# Patient Record
Sex: MALE | Race: OTHER | HISPANIC OR LATINO
[De-identification: names, ages, dates, MRNs, and addresses within clinical notes are randomized per-mention and may not be internally consistent; named-entity substitution may affect disease eponyms.]

---

## 2017-06-27 ENCOUNTER — RECORD ABSTRACTING (OUTPATIENT)
Age: 22
End: 2017-06-27

## 2017-07-17 ENCOUNTER — APPOINTMENT (OUTPATIENT)
Dept: PEDIATRIC HEMATOLOGY/ONCOLOGY | Facility: CLINIC | Age: 22
End: 2017-07-17

## 2017-07-17 VITALS
WEIGHT: 159.17 LBS | HEIGHT: 71.38 IN | DIASTOLIC BLOOD PRESSURE: 71 MMHG | BODY MASS INDEX: 22.04 KG/M2 | SYSTOLIC BLOOD PRESSURE: 110 MMHG | HEART RATE: 78 BPM | TEMPERATURE: 98.4 F

## 2017-07-17 RX ORDER — CALCIUM CITRATE/VITAMIN D3 315MG-6.25
TABLET ORAL DAILY
Refills: 0 | Status: ACTIVE | COMMUNITY

## 2017-07-17 RX ORDER — CHOLECALCIFEROL (VITAMIN D3) 50 MCG
50 MCG TABLET ORAL DAILY
Refills: 0 | Status: ACTIVE | COMMUNITY

## 2017-07-25 ENCOUNTER — FORM ENCOUNTER (OUTPATIENT)
Age: 22
End: 2017-07-25

## 2017-07-26 ENCOUNTER — OUTPATIENT (OUTPATIENT)
Dept: OUTPATIENT SERVICES | Facility: HOSPITAL | Age: 22
LOS: 1 days | End: 2017-07-26
Payer: COMMERCIAL

## 2017-07-26 ENCOUNTER — APPOINTMENT (OUTPATIENT)
Dept: RADIOLOGY | Facility: IMAGING CENTER | Age: 22
End: 2017-07-26

## 2017-07-26 DIAGNOSIS — T38.0X5D ADVERSE EFFECT OF GLUCOCORTICOIDS AND SYNTHETIC ANALOGUES, SUBSEQUENT ENCOUNTER: ICD-10-CM

## 2017-07-26 PROCEDURE — 77080 DXA BONE DENSITY AXIAL: CPT

## 2017-08-10 ENCOUNTER — RESULT REVIEW (OUTPATIENT)
Age: 22
End: 2017-08-10

## 2020-02-05 ENCOUNTER — APPOINTMENT (OUTPATIENT)
Dept: PEDIATRIC HEMATOLOGY/ONCOLOGY | Facility: CLINIC | Age: 25
End: 2020-02-05
Payer: COMMERCIAL

## 2020-02-05 VITALS
WEIGHT: 167.55 LBS | HEIGHT: 71.57 IN | TEMPERATURE: 97.5 F | SYSTOLIC BLOOD PRESSURE: 125 MMHG | DIASTOLIC BLOOD PRESSURE: 77 MMHG | HEART RATE: 72 BPM | BODY MASS INDEX: 22.94 KG/M2

## 2020-02-05 DIAGNOSIS — Z78.9 OTHER SPECIFIED HEALTH STATUS: ICD-10-CM

## 2020-02-05 DIAGNOSIS — T38.0X5D ADVERSE EFFECT OF GLUCOCORTICOIDS AND SYNTHETIC ANALOGUES, SUBSEQUENT ENCOUNTER: ICD-10-CM

## 2020-02-05 PROCEDURE — 99215 OFFICE O/P EST HI 40 MIN: CPT

## 2020-02-07 PROBLEM — Z78.9 DOES NOT USE ILLICIT DRUGS: Status: ACTIVE | Noted: 2020-02-07

## 2020-02-07 PROBLEM — T38.0X5D ADVERSE EFFECT OF CORTICOSTEROIDS, SUBSEQUENT ENCOUNTER: Status: ACTIVE | Noted: 2017-07-17

## 2020-02-07 PROBLEM — Z78.9 DOES NOT USE TOBACCO: Status: ACTIVE | Noted: 2020-02-07

## 2020-02-07 PROBLEM — Z78.9 SOCIAL ALCOHOL USE: Status: ACTIVE | Noted: 2020-02-07

## 2020-02-07 LAB
25(OH)D3 SERPL-MCNC: 36.2 NG/ML
ALBUMIN SERPL ELPH-MCNC: 5.3 G/DL
ALP BLD-CCNC: 69 U/L
ALT SERPL-CCNC: 18 U/L
ANION GAP SERPL CALC-SCNC: 17 MMOL/L
AST SERPL-CCNC: 16 U/L
BASOPHILS # BLD AUTO: 0.05 K/UL
BASOPHILS NFR BLD AUTO: 0.7 %
BILIRUB SERPL-MCNC: 0.3 MG/DL
BUN SERPL-MCNC: 12 MG/DL
CALCIUM SERPL-MCNC: 10.2 MG/DL
CALCIUM SERPL-MCNC: 10.2 MG/DL
CHLORIDE SERPL-SCNC: 103 MMOL/L
CHOLEST SERPL-MCNC: 178 MG/DL
CHOLEST/HDLC SERPL: 3.1 RATIO
CO2 SERPL-SCNC: 22 MMOL/L
CREAT SERPL-MCNC: 0.98 MG/DL
EOSINOPHIL # BLD AUTO: 0.16 K/UL
EOSINOPHIL NFR BLD AUTO: 2.1 %
ESTIMATED AVERAGE GLUCOSE: 108 MG/DL
GLUCOSE SERPL-MCNC: 92 MG/DL
HBA1C MFR BLD HPLC: 5.4 %
HCT VFR BLD CALC: 49.7 %
HDLC SERPL-MCNC: 58 MG/DL
HGB BLD-MCNC: 16.4 G/DL
IMM GRANULOCYTES NFR BLD AUTO: 0.1 %
LDLC SERPL CALC-MCNC: 102 MG/DL
LYMPHOCYTES # BLD AUTO: 3.36 K/UL
LYMPHOCYTES NFR BLD AUTO: 44.9 %
MAN DIFF?: NORMAL
MCHC RBC-ENTMCNC: 31 PG
MCHC RBC-ENTMCNC: 33 GM/DL
MCV RBC AUTO: 94 FL
MONOCYTES # BLD AUTO: 0.44 K/UL
MONOCYTES NFR BLD AUTO: 5.9 %
NEUTROPHILS # BLD AUTO: 3.46 K/UL
NEUTROPHILS NFR BLD AUTO: 46.3 %
PARATHYROID HORMONE INTACT: 22 PG/ML
PLATELET # BLD AUTO: 190 K/UL
POTASSIUM SERPL-SCNC: 4.1 MMOL/L
PROT SERPL-MCNC: 8 G/DL
RBC # BLD: 5.29 M/UL
RBC # FLD: 13 %
SODIUM SERPL-SCNC: 143 MMOL/L
TRIGL SERPL-MCNC: 91 MG/DL
WBC # FLD AUTO: 7.48 K/UL

## 2020-02-07 NOTE — CONSULT LETTER
[Dear  ___] : Dear  [unfilled], [Courtesy Letter:] : I had the pleasure of seeing your patient, [unfilled], in my office today. [Please see my note below.] : Please see my note below. [Consult Closing:] : Thank you very much for allowing me to participate in the care of this patient.  If you have any questions, please do not hesitate to contact me. [Sincerely,] : Sincerely, [FreeTextEntry2] : Fabiana Howard MD\par [address]\[place] [address]\par Tel: [phone]\par Fax: [phone] [FreeTextEntry3] : Alfredo Her MD\par  of Pediatrics\par [facility]\par Section Head, Survivors Facing Forward Program\par Hematology / Oncology and Stem Cell Transplantation\par [facility]\par [email or web address]\par [email or web address]\par [phone]\par

## 2020-02-07 NOTE — SOCIAL HISTORY
[Mother] : mother [Father] : father [Brother] : brother [College] : College [IEP/504] : does not have an IEP/504 currently in place [Sexually Active] : not sexually active

## 2020-02-07 NOTE — HISTORY OF PRESENT ILLNESS
[de-identified] : 22.4 year old young man now 15.3 years on therapy for acute lymphoblastic anemia. Since his last visit Franklyn has been well without any visits to the emergency room, hospitalizations or surgeries. He's not had persistent fevers weight loss or bone pain. Franklyn's posttreatment course has been complicated by low bone mineral density. At this visit, Franklyn expressed concern about a 'funny feeling' in his knee, and intermittent back pain.\par \par Franklyn graduated from [employer] where he studied information technology, and now works in IT for [employer]. Since his last, he has made a concerted effort to increase his exercise, including resistance. He reported that his diet has been generally healthy. [de-identified] : Yes. [de-identified] : Yes. [de-identified] : Yes. [de-identified] : Yes. [de-identified] : Yes. [de-identified] : Yes.

## 2020-02-07 NOTE — PHYSICAL EXAM
[Testes] : normal [Scars ___] : scars [unfilled] [No focal deficits] : no focal deficits [Gait normal] : gait normal [Motor Exam nomal] : motor exam normal [Normal] : affect appropriate [100: Normal, no complaints, no evidence of disease.] : 100: Normal, no complaints, no evidence of disease. [de-identified] : No weakness, reproducible pain or limit to range of motion in either knee or the spine

## 2020-02-07 NOTE — RESULTS/DATA
[FreeTextEntry1] : EXAM:  BONE DENSITY - CENTRAL  \par \par PROCEDURE DATE:  07/26/2017  \par  \par INTERPRETATION:  CLINICAL STATEMENT: History of acute lymphoblastic \par leukemia and prior abnormal test.\par \par Thank you for referring your patient for bone densitometry on the [employer] \par [employer]. The results are expressed as the Z-score, or the standard \par deviation from age-matched values. A Z-score of -2.0 or lower is defined \par as below the expected range for age. A Z-score above -2.0 is within the \par expected range for age. \par \par COMPARISON: None.\par \par RESULTS:\par \par Left femoral neck: -1.9, within the expected range for age.\par Left total hip: -2.1, below the expected range for age.\par \par Spine: -2.4, below the expected range for age.\par \par IMPRESSION: Low bone density.\par \par FRACTURE RISK: The risk of fracture is above average.\par \par TREATMENT RECOMMENDATIONS: \par \par All treatment decisions require clinical judgment and consideration of \par individual patient factors, including patient preferences, comorbidities, \par previous drug use, risk factors not captured in the FRAX model (e.g. \par frailty, falls, Vitamin D deficiency, increased bone turnover, interval \par significant decline in bone density) and possible under or over \par estimation of fracture risk by FRAX. \par \par In addition the NOF Guide recommends that FDA-approved medical therapies \par be considered in postmenopausal woman and men age >= 50 years with a:  \par * hip or vertebral (clinical or morphometric) fracture.\par * T-score of <=-2.5 at the spine or hip. \par * 10 years fracture probability by FRAX of >=  3.0% for hip fracture or \par >=  20% for major osteoporotic fracture.\par \par NAREN PEDRO M.D., NUCLEAR MEDICINE ATTENDING\par This document has been electronically signed. Jul 26 2017  1:52PM

## 2020-02-20 ENCOUNTER — APPOINTMENT (OUTPATIENT)
Dept: RADIOLOGY | Facility: IMAGING CENTER | Age: 25
End: 2020-02-20

## 2020-03-12 ENCOUNTER — APPOINTMENT (OUTPATIENT)
Dept: RADIOLOGY | Facility: IMAGING CENTER | Age: 25
End: 2020-03-12

## 2020-04-07 ENCOUNTER — APPOINTMENT (OUTPATIENT)
Dept: RADIOLOGY | Facility: IMAGING CENTER | Age: 25
End: 2020-04-07

## 2020-05-11 ENCOUNTER — TRANSCRIPTION ENCOUNTER (OUTPATIENT)
Age: 25
End: 2020-05-11

## 2020-06-29 ENCOUNTER — RESULT REVIEW (OUTPATIENT)
Age: 25
End: 2020-06-29

## 2020-06-29 ENCOUNTER — APPOINTMENT (OUTPATIENT)
Dept: RADIOLOGY | Facility: IMAGING CENTER | Age: 25
End: 2020-06-29
Payer: COMMERCIAL

## 2020-06-29 ENCOUNTER — OUTPATIENT (OUTPATIENT)
Dept: OUTPATIENT SERVICES | Facility: HOSPITAL | Age: 25
LOS: 1 days | End: 2020-06-29
Payer: COMMERCIAL

## 2020-06-29 DIAGNOSIS — Z00.8 ENCOUNTER FOR OTHER GENERAL EXAMINATION: ICD-10-CM

## 2020-06-29 PROCEDURE — 77080 DXA BONE DENSITY AXIAL: CPT | Mod: 26

## 2020-06-29 PROCEDURE — 77080 DXA BONE DENSITY AXIAL: CPT

## 2022-02-17 ENCOUNTER — NON-APPOINTMENT (OUTPATIENT)
Age: 27
End: 2022-02-17

## 2022-02-17 ENCOUNTER — APPOINTMENT (OUTPATIENT)
Dept: OPHTHALMOLOGY | Facility: CLINIC | Age: 27
End: 2022-02-17
Payer: COMMERCIAL

## 2022-02-17 PROCEDURE — 92004 COMPRE OPH EXAM NEW PT 1/>: CPT

## 2022-02-17 PROCEDURE — 92015 DETERMINE REFRACTIVE STATE: CPT

## 2022-02-17 PROCEDURE — 92025 CPTRIZED CORNEAL TOPOGRAPHY: CPT

## 2022-10-06 ENCOUNTER — APPOINTMENT (OUTPATIENT)
Dept: ORTHOPEDIC SURGERY | Facility: CLINIC | Age: 27
End: 2022-10-06

## 2022-10-06 VITALS — HEIGHT: 72 IN | BODY MASS INDEX: 23.03 KG/M2 | WEIGHT: 170 LBS

## 2022-10-06 DIAGNOSIS — M79.18 MYALGIA, OTHER SITE: ICD-10-CM

## 2022-10-06 DIAGNOSIS — M23.92 UNSPECIFIED INTERNAL DERANGEMENT OF LEFT KNEE: ICD-10-CM

## 2022-10-06 PROCEDURE — L1833: CPT | Mod: LT

## 2022-10-06 PROCEDURE — 73564 X-RAY EXAM KNEE 4 OR MORE: CPT | Mod: LT

## 2022-10-06 PROCEDURE — 99204 OFFICE O/P NEW MOD 45 MIN: CPT

## 2022-10-06 RX ORDER — NAPROXEN 500 MG/1
500 TABLET ORAL TWICE DAILY
Qty: 60 | Refills: 0 | Status: ACTIVE | COMMUNITY
Start: 2022-10-06 | End: 1900-01-01

## 2022-10-06 NOTE — IMAGING
[de-identified] : LEFT KNEE\par Inspection:  mild effusion\par Palpation: lateral joint line tenderness \par Knee Range of Motion:  0-130 \par Strength: 5/5 Quadriceps strength, 5/5 Hamstring strength\par Neurological: light touch is intact throughout\par Ligament Stability and Special Tests: \par McMurrays: Positive\par Lachman: neg\par Pivot Shift: neg\par Posterior Drawer: neg\par Valgus: neg\par Varus: neg\par Patella Apprehension: neg\par Patella Maltracking: neg\par \par

## 2022-10-06 NOTE — ASSESSMENT
[FreeTextEntry1] : Left X-Ray Examination of the KNEE (4 views): there are no fractures, subluxations or dislocations.\par \par Due to the patients mechanical symptoms along with lateral joint line pain, effusion, and pos annamaria test on exam we will get an mri to eval for lateral meniscus tear\par \par - The patient was advised to apply ice (wrapped in a towel or protective covering) to the area daily (20 minutes at a time, 2-4X/day).\par - The patient was advised to modify their activities.\par - Hinged Knee brace in order to minimize buckling and instability\par - naproxen \par - Patient was given a prescription for an anti-inflammatory medication.  They will take it for the next week and then on an as needed basis, as long as there are no medical contra-indications.  Patient is counseled on possible GI, renal, and cardiovascular side effects.\par - f/u after mri\par

## 2022-10-06 NOTE — HISTORY OF PRESENT ILLNESS
[de-identified] : 27 year old male  (RHD, [employer] ) left knee pain since 9/2022 after going to a night club was dancing and then twisetd fell on it \par The pain is located  posterior, lateral\par The pain is associated with  weakness, clicking , popping , catching, and instability\par Worse with activity and better at rest.\par Has tried ice, activity mod\par

## 2022-10-09 ENCOUNTER — FORM ENCOUNTER (OUTPATIENT)
Age: 27
End: 2022-10-09

## 2022-10-10 ENCOUNTER — APPOINTMENT (OUTPATIENT)
Dept: MRI IMAGING | Facility: CLINIC | Age: 27
End: 2022-10-10

## 2022-10-10 PROCEDURE — 73721 MRI JNT OF LWR EXTRE W/O DYE: CPT | Mod: LT

## 2022-10-13 PROBLEM — S83.512A SPRAIN OF ANTERIOR CRUCIATE LIGAMENT OF LEFT KNEE, INITIAL ENCOUNTER: Status: ACTIVE | Noted: 2022-10-13

## 2022-10-17 ENCOUNTER — APPOINTMENT (OUTPATIENT)
Dept: ORTHOPEDIC SURGERY | Facility: CLINIC | Age: 27
End: 2022-10-17

## 2022-10-17 DIAGNOSIS — S83.512A SPRAIN OF ANTERIOR CRUCIATE LIGAMENT OF LEFT KNEE, INITIAL ENCOUNTER: ICD-10-CM

## 2022-10-17 PROCEDURE — 99214 OFFICE O/P EST MOD 30 MIN: CPT

## 2022-10-17 NOTE — IMAGING
[de-identified] : LEFT KNEE\par Inspection:  mild effusion\par Palpation: lateral joint line tenderness \par Knee Range of Motion:  0-130 \par Strength: 5/5 Quadriceps strength, 5/5 Hamstring strength\par Neurological: light touch is intact throughout\par Ligament Stability and Special Tests: \par McMurrays: neg\par Lachman: neg\par Pivot Shift: neg\par Posterior Drawer: neg\par Valgus: neg\par Varus: neg\par Patella Apprehension: neg\par Patella Maltracking: neg\par \par

## 2022-10-17 NOTE — ASSESSMENT
[FreeTextEntry1] : mri left knee 10/10/22 - acl sprian, synov\par \par \par - The patient was advised of the diagnosis.  The natural history of the pathology was explained to the patient in layman's terms.  Several different treatment options were discussed and explained including the risks and benefits of both surgical and non-surgical treatments.  All questions and concerns were answered.\par - We will continue conservative treatment with PT, icing, and anti-inflammatory medications.\par - The patient was advised to let pain guide the gradual advancement of activities.\par - naproxen \par - Patient was given a prescription for an anti-inflammatory medication.  They will take it for the next week and then on an as needed basis, as long as there are no medical contra-indications.  Patient is counseled on possible GI, renal, and cardiovascular side effects.\par \par

## 2022-10-17 NOTE — HISTORY OF PRESENT ILLNESS
[de-identified] : 27 year old male  (RHD, [employer] ) left knee pain since 9/2022 after going to a night club was dancing and then twisetd fell on it \par The pain is located  posterior, lateral\par The pain is associated with  weakness, clicking , popping , catching, and instability\par Worse with activity and better at rest.\par Has tried ice, activity mod\par \par 10/17/22 - had mri showing acl sprian, using ice and nsaids

## 2022-12-12 ENCOUNTER — APPOINTMENT (OUTPATIENT)
Dept: PEDIATRIC HEMATOLOGY/ONCOLOGY | Facility: CLINIC | Age: 27
End: 2022-12-12

## 2022-12-12 VITALS
BODY MASS INDEX: 23.92 KG/M2 | HEIGHT: 72.05 IN | WEIGHT: 176.59 LBS | SYSTOLIC BLOOD PRESSURE: 133 MMHG | HEART RATE: 97 BPM | DIASTOLIC BLOOD PRESSURE: 79 MMHG | TEMPERATURE: 97.5 F

## 2022-12-12 DIAGNOSIS — Z92.21 PERSONAL HISTORY OF ANTINEOPLASTIC CHEMOTHERAPY: ICD-10-CM

## 2022-12-12 DIAGNOSIS — Z91.89 OTHER SPECIFIED PERSONAL RISK FACTORS, NOT ELSEWHERE CLASSIFIED: ICD-10-CM

## 2022-12-12 DIAGNOSIS — Z08 ENCOUNTER FOR FOLLOW-UP EXAMINATION AFTER COMPLETED TREATMENT FOR MALIGNANT NEOPLASM: ICD-10-CM

## 2022-12-12 DIAGNOSIS — Z85.6 PERSONAL HISTORY OF LEUKEMIA: ICD-10-CM

## 2022-12-12 PROCEDURE — 99215 OFFICE O/P EST HI 40 MIN: CPT

## 2022-12-12 NOTE — HISTORY OF PRESENT ILLNESS
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 Since his last visit in 2020 Franklyn has been well without any visits to the emergency room, hospitalizations or surgeries. He reports a left knee injury that occurred several months ago after falling at a party. He was seen by Orthopedics and is now receiving physical therapy.   He has not had persistent fevers weight loss or bone pain. Franklyn's posttreatment course has been complicated by improving low bone mineral density. \par \par Franklyn graduated from [employer] where he studied information technology, and now works as a  and works for a [employer] company that is based out of [place].  He reports doing weight bearing exercise at least 3 times a week and reports eating a well-rounded diet.  [de-identified] : Yes. [de-identified] : Yes. [de-identified] : Yes. [de-identified] : Yes. [de-identified] : Yes. [de-identified] : Yes.

## 2022-12-12 NOTE — SOCIAL HISTORY
[Mother] : mother [Father] : father [Brother] : brother [IEP/504] : does not have an IEP/504 currently in place [FreeTextEntry1] : college graduate [Sexually Active] : not sexually active

## 2022-12-12 NOTE — CONSULT LETTER
[Dear  ___] : Dear  [unfilled], [Courtesy Letter:] : I had the pleasure of seeing your patient, [unfilled], in my office today. [Please see my note below.] : Please see my note below. [Consult Closing:] : Thank you very much for allowing me to participate in the care of this patient.  If you have any questions, please do not hesitate to contact me. [FreeTextEntry2] : Fabiana Howard MD\par [address]\[place] [address]\par Tel: [phone]\par Fax: [phone] [FreeTextEntry1] : Franklyn was seen for a follow up visit in the Survivors Facing Forward Program at the [facility].\par  [FreeTextEntry3] : \par MANFRED Crisostomo\par Family Nurse Practitioner \par [facility] \par Pediatric Hematology/Oncology\par Survivors Facing Forward Program\par [phone]\par [email or web address] \par \par   \par \par \par

## 2023-04-12 ENCOUNTER — NON-APPOINTMENT (OUTPATIENT)
Age: 28
End: 2023-04-12

## 2023-04-20 ENCOUNTER — NON-APPOINTMENT (OUTPATIENT)
Age: 28
End: 2023-04-20

## 2023-06-15 ENCOUNTER — NON-APPOINTMENT (OUTPATIENT)
Age: 28
End: 2023-06-15

## 2024-09-07 ENCOUNTER — APPOINTMENT (OUTPATIENT)
Dept: ULTRASOUND IMAGING | Facility: CLINIC | Age: 29
End: 2024-09-07
Payer: COMMERCIAL

## 2024-09-07 PROCEDURE — 76705 ECHO EXAM OF ABDOMEN: CPT
